# Patient Record
Sex: MALE | Race: WHITE | NOT HISPANIC OR LATINO | Employment: FULL TIME | URBAN - METROPOLITAN AREA
[De-identification: names, ages, dates, MRNs, and addresses within clinical notes are randomized per-mention and may not be internally consistent; named-entity substitution may affect disease eponyms.]

---

## 2017-04-17 ENCOUNTER — NON-PROVIDER VISIT (OUTPATIENT)
Dept: URGENT CARE | Facility: PHYSICIAN GROUP | Age: 52
End: 2017-04-17

## 2017-04-17 DIAGNOSIS — Z02.1 DRUG TESTING, PRE-EMPLOYMENT: ICD-10-CM

## 2017-04-17 PROCEDURE — 8898 PR URINE 11 PANEL - SEND TO LAB: Performed by: PHYSICIAN ASSISTANT

## 2017-04-17 NOTE — PATIENT INSTRUCTIONS
Heriberto Bowman Martin is a 51 y.o. male here for a non-provider visit for uds    If abnormal was an in office provider notified today (if so, indicate provider)? No  Routed to PCP? No

## 2017-05-22 ENCOUNTER — NON-PROVIDER VISIT (OUTPATIENT)
Dept: URGENT CARE | Facility: PHYSICIAN GROUP | Age: 52
End: 2017-05-22

## 2017-05-22 DIAGNOSIS — Z02.1 PRE-EMPLOYMENT DRUG SCREENING: ICD-10-CM

## 2017-05-22 LAB
AMP AMPHETAMINE: NORMAL
BAR BARBITURATES: NORMAL
BZO BENZODIAZEPINES: NORMAL
COC COCAINE: NORMAL
INT CON NEG: NEGATIVE
INT CON POS: POSITIVE
MDMA ECSTASY: NORMAL
MET METHAMPHETAMINES: NORMAL
MTD METHADONE: NORMAL
OPI OPIATES: NORMAL
OXY OXYCODONE: NORMAL
PCP PHENCYCLIDINE: NORMAL
POC URINE DRUG SCREEN OCDRS: NEGATIVE
THC: NORMAL

## 2017-05-22 PROCEDURE — 80305 DRUG TEST PRSMV DIR OPT OBS: CPT | Performed by: PHYSICIAN ASSISTANT

## 2017-05-22 NOTE — MR AVS SNAPSHOT
Heriberto Salazar   2017 8:15 AM   Non-Provider Visit   MRN: [id number]    Department:  [facility]   Dept Phone:  [phone]    Description:  Male : 1965   Provider:  [facility]           Reason for Visit     Drug / Alcohol Assessment           Allergies as of 2017     Not on File      You were diagnosed with     Pre-employment drug screening   [331168]         Basic Information     Date Of Birth Sex Race Ethnicity Preferred Language    1965 Male White Non- English      Health Maintenance     Patient has no pending health maintenance at this time      Results     POCT 11 Panel Urine Drug Screen      Component    AMPHETAMINE    COCAINE    POC THC    METHAMPHETAMINES    OPIATES    PHENCYCLIDINE    BENZODIAZIPINES    BARBITURATES    METHADONE    MDMA Ecstasy    OXYCODONE    Urine Drug Screen    negative    Internal Control Positive    Positive    Internal Control Negative    Negative                        Current Immunizations     No immunizations on file.      Below and/or attached are the medications your provider expects you to take. Review all of your home medications and newly ordered medications with your provider and/or pharmacist. Follow medication instructions as directed by your provider and/or pharmacist. Please keep your medication list with you and share with your provider. Update the information when medications are discontinued, doses are changed, or new medications (including over-the-counter products) are added; and carry medication information at all times in the event of emergency situations     Allergies:  (Not on file)          Medications  Valid as of: May 22, 2017 - 11:21 AM    Generic Name Brand Name Tablet Size Instructions for use    .                 Medicines prescribed today were sent to:     None      Medication refill instructions:       If your prescription bottle indicates you have medication refills left, it is not necessary to  call your provider’s office. Please contact your pharmacy and they will refill your medication.    If your prescription bottle indicates you do not have any refills left, you may request refills at any time through one of the following ways: The online [employer] system (except Urgent Care), by calling your provider’s office, or by asking your pharmacy to contact your provider’s office with a refill request. Medication refills are processed only during regular business hours and may not be available until the next business day. Your provider may request additional information or to have a follow-up visit with you prior to refilling your medication.   *Please Note: Medication refills are assigned a new Rx number when refilled electronically. Your pharmacy may indicate that no refills were authorized even though a new prescription for the same medication is available at the pharmacy. Please request the medicine by name with the pharmacy before contacting your provider for a refill.        Instructions    Heriberto Salazar is a 52 y.o. male here for a non-provider visit for uds    If abnormal was an in office provider notified today (if so, indicate provider)? No  Routed to PCP? No            [employer] Status: Patient Declined

## 2017-05-22 NOTE — PATIENT INSTRUCTIONS
Heriberto Bowman Martin is a 52 y.o. male here for a non-provider visit for uds    If abnormal was an in office provider notified today (if so, indicate provider)? No  Routed to PCP? No

## 2022-02-16 PROBLEM — E78.5 HYPERLIPIDEMIA: Status: ACTIVE | Noted: 2022-02-16

## 2022-02-16 PROBLEM — E03.8 OTHER SPECIFIED HYPOTHYROIDISM: Status: ACTIVE | Noted: 2022-02-16

## 2022-02-16 PROBLEM — I10 ESSENTIAL HYPERTENSION: Status: ACTIVE | Noted: 2022-02-16
